# Patient Record
Sex: FEMALE | Race: BLACK OR AFRICAN AMERICAN | NOT HISPANIC OR LATINO | URBAN - METROPOLITAN AREA
[De-identification: names, ages, dates, MRNs, and addresses within clinical notes are randomized per-mention and may not be internally consistent; named-entity substitution may affect disease eponyms.]

---

## 2017-01-20 ENCOUNTER — IMPORTED ENCOUNTER (OUTPATIENT)
Dept: URBAN - METROPOLITAN AREA CLINIC 9 | Facility: CLINIC | Age: 82
End: 2017-01-20

## 2017-01-24 ENCOUNTER — IMPORTED ENCOUNTER (OUTPATIENT)
Dept: URBAN - METROPOLITAN AREA CLINIC 9 | Facility: CLINIC | Age: 82
End: 2017-01-24

## 2017-03-01 ENCOUNTER — IMPORTED ENCOUNTER (OUTPATIENT)
Dept: URBAN - METROPOLITAN AREA CLINIC 9 | Facility: CLINIC | Age: 82
End: 2017-03-01

## 2017-03-07 ENCOUNTER — IMPORTED ENCOUNTER (OUTPATIENT)
Dept: URBAN - METROPOLITAN AREA CLINIC 9 | Facility: CLINIC | Age: 82
End: 2017-03-07

## 2017-07-20 ENCOUNTER — IMPORTED ENCOUNTER (OUTPATIENT)
Dept: URBAN - METROPOLITAN AREA CLINIC 9 | Facility: CLINIC | Age: 82
End: 2017-07-20

## 2017-08-23 ENCOUNTER — IMPORTED ENCOUNTER (OUTPATIENT)
Dept: URBAN - METROPOLITAN AREA CLINIC 9 | Facility: CLINIC | Age: 82
End: 2017-08-23

## 2017-11-20 ENCOUNTER — IMPORTED ENCOUNTER (OUTPATIENT)
Dept: URBAN - METROPOLITAN AREA CLINIC 9 | Facility: CLINIC | Age: 82
End: 2017-11-20

## 2018-03-05 ENCOUNTER — IMPORTED ENCOUNTER (OUTPATIENT)
Dept: URBAN - METROPOLITAN AREA CLINIC 9 | Facility: CLINIC | Age: 83
End: 2018-03-05

## 2018-06-04 ENCOUNTER — IMPORTED ENCOUNTER (OUTPATIENT)
Dept: URBAN - METROPOLITAN AREA CLINIC 9 | Facility: CLINIC | Age: 83
End: 2018-06-04

## 2018-09-10 ENCOUNTER — IMPORTED ENCOUNTER (OUTPATIENT)
Dept: URBAN - METROPOLITAN AREA CLINIC 9 | Facility: CLINIC | Age: 83
End: 2018-09-10

## 2018-10-19 ENCOUNTER — IMPORTED ENCOUNTER (OUTPATIENT)
Dept: URBAN - METROPOLITAN AREA CLINIC 9 | Facility: CLINIC | Age: 83
End: 2018-10-19

## 2018-12-11 ENCOUNTER — IMPORTED ENCOUNTER (OUTPATIENT)
Dept: URBAN - METROPOLITAN AREA CLINIC 9 | Facility: CLINIC | Age: 83
End: 2018-12-11

## 2019-03-19 ENCOUNTER — IMPORTED ENCOUNTER (OUTPATIENT)
Dept: URBAN - METROPOLITAN AREA CLINIC 9 | Facility: CLINIC | Age: 84
End: 2019-03-19

## 2019-04-10 ENCOUNTER — IMPORTED ENCOUNTER (OUTPATIENT)
Dept: URBAN - METROPOLITAN AREA CLINIC 9 | Facility: CLINIC | Age: 84
End: 2019-04-10

## 2019-04-16 ENCOUNTER — IMPORTED ENCOUNTER (OUTPATIENT)
Dept: URBAN - METROPOLITAN AREA CLINIC 9 | Facility: CLINIC | Age: 84
End: 2019-04-16

## 2019-05-08 ENCOUNTER — IMPORTED ENCOUNTER (OUTPATIENT)
Dept: URBAN - METROPOLITAN AREA CLINIC 9 | Facility: CLINIC | Age: 84
End: 2019-05-08

## 2019-05-14 ENCOUNTER — IMPORTED ENCOUNTER (OUTPATIENT)
Dept: URBAN - METROPOLITAN AREA CLINIC 9 | Facility: CLINIC | Age: 84
End: 2019-05-14

## 2019-08-15 ENCOUNTER — IMPORTED ENCOUNTER (OUTPATIENT)
Dept: URBAN - METROPOLITAN AREA CLINIC 9 | Facility: CLINIC | Age: 84
End: 2019-08-15

## 2019-10-15 ENCOUNTER — IMPORTED ENCOUNTER (OUTPATIENT)
Dept: URBAN - METROPOLITAN AREA CLINIC 9 | Facility: CLINIC | Age: 84
End: 2019-10-15

## 2019-10-23 ENCOUNTER — IMPORTED ENCOUNTER (OUTPATIENT)
Dept: URBAN - METROPOLITAN AREA CLINIC 9 | Facility: CLINIC | Age: 84
End: 2019-10-23

## 2020-01-20 ENCOUNTER — IMPORTED ENCOUNTER (OUTPATIENT)
Dept: URBAN - METROPOLITAN AREA CLINIC 9 | Facility: CLINIC | Age: 85
End: 2020-01-20

## 2020-02-03 NOTE — PATIENT DISCUSSION
Risks, Benefits and Alternatives were discussed with patient at length for Cataract Surgery. Visual symptoms are consistent with Cataract findings on examination and current refraction no longer provides satisfactory vision. Explained advantages of Toric IOL to correct significant corneal astigmatism. LRI/AK's are also explained. Intraoperative abberometry is included to maximize lens power and position. LRI/AK's and laser vision correction are explained as alternatives or adjunct procedures. Corrective measures for astigmatism management for lens repositioning and possible addtional refractive surgery are included. Glasses or contacts will be needed for best vision at distance and near if not selected. The advantages of intraoperative abberometry were explained to the patient to improve IOL selection, particularly in patients with unusually short or long eyes, previous refractive surgery, or desiring monovision correction at the time of cataract surgery.  Patients with signs of poor dilation on exam may necessitate intraocular manipulation of pupil and can be associated with surgical complications. Patient understands and desires surgery. All questions answered. Risks, Benefits and Alternatives discussed at length for IOL placement. Doctor suggests. Patient desires. Patient will need to wear glasses for.

## 2020-02-14 NOTE — PATIENT DISCUSSION
Discussed with patient that she may notice rings  from the multi focal lens. Patient acknowledged that she understood. All questions answered re: activities allowed and surgical procedure and lens options.

## 2020-02-14 NOTE — PATIENT DISCUSSION
Risks, Benefits and Alternatives were discussed with patient at length for Cataract Surgery. Visual symptoms are consistent with Cataract findings on examination and current refraction no longer provides satisfactory vision. Explained advantages of Toric IOL to correct significant corneal astigmatism. LRI/AK's are also explained. Intraoperative abberometry is included to maximize lens power and position. LRI/AK's and laser vision correction are explained as alternatives or adjunct procedures. Corrective measures for astigmatism management for lens repositioning and possible addtional refractive surgery are included. Glasses or contacts will be needed for best vision at distance and near if not selected. The advantages of intraoperative abberometry were explained to the patient to improve IOL selection, particularly in patients with unusually short or long eyes, previous refractive surgery, or desiring monovision correction at the time of cataract surgery.  Patients with signs of poor dilation on exam may necessitate intraocular manipulation of pupil and can be associated with surgical complications. Patient understands and desires surgery. All questions answered. Risks, Benefits and Alternatives discussed at length for IOL placement. Doctor suggests MF/Toric. Patient desires MF/Toric. Patient will need to wear glasses for best corrected vision at near.

## 2020-03-03 NOTE — PATIENT DISCUSSION
Risks, Benefits and Alternatives were discussed with patient at length for Cataract Surgery. Visual symptoms are consistent with Cataract findings on examination and current refraction no longer provides satisfactory vision. Explained advantages of Toric IOL to correct significant corneal astigmatism. LRI/AK's are also explained. Intraoperative abberometry is included to maximize lens power and position. LRI/AK's and laser vision correction are explained as alternatives or adjunct procedures. Corrective measures for astigmatism management for lens repositioning and possible addtional refractive surgery are included. Glasses or contacts will be needed for best vision at distance and near if not selected. The advantages of intraoperative abberometry were explained to the patient to improve IOL selection, particularly in patients with unusually short or long eyes, previous refractive surgery, or desiring monovision correction at the time of cataract surgery.  Patients with signs of poor dilation on exam may necessitate intraocular manipulation of pupil and can be associated with surgical complications. Patient understands and desires surgery. All questions answered. Risks, Benefits and Alternatives discussed at length for IOL placement. Doctor suggests MF/Toric. Patient desiresMF/Toric. Patient will need to wear glasses for best corrected vision at near.

## 2020-05-21 ENCOUNTER — IMPORTED ENCOUNTER (OUTPATIENT)
Dept: URBAN - METROPOLITAN AREA CLINIC 9 | Facility: CLINIC | Age: 85
End: 2020-05-21

## 2020-08-24 ENCOUNTER — IMPORTED ENCOUNTER (OUTPATIENT)
Dept: URBAN - METROPOLITAN AREA CLINIC 9 | Facility: CLINIC | Age: 85
End: 2020-08-24

## 2020-11-18 ENCOUNTER — IMPORTED ENCOUNTER (OUTPATIENT)
Dept: URBAN - METROPOLITAN AREA CLINIC 9 | Facility: CLINIC | Age: 85
End: 2020-11-18

## 2020-12-23 ENCOUNTER — IMPORTED ENCOUNTER (OUTPATIENT)
Dept: URBAN - METROPOLITAN AREA CLINIC 9 | Facility: CLINIC | Age: 85
End: 2020-12-23

## 2021-03-24 ENCOUNTER — IMPORTED ENCOUNTER (OUTPATIENT)
Dept: URBAN - METROPOLITAN AREA CLINIC 9 | Facility: CLINIC | Age: 86
End: 2021-03-24

## 2021-04-01 ENCOUNTER — IMPORTED ENCOUNTER (OUTPATIENT)
Dept: URBAN - METROPOLITAN AREA CLINIC 9 | Facility: CLINIC | Age: 86
End: 2021-04-01

## 2021-04-08 ENCOUNTER — IMPORTED ENCOUNTER (OUTPATIENT)
Dept: URBAN - METROPOLITAN AREA CLINIC 9 | Facility: CLINIC | Age: 86
End: 2021-04-08

## 2021-04-19 ENCOUNTER — IMPORTED ENCOUNTER (OUTPATIENT)
Dept: URBAN - METROPOLITAN AREA CLINIC 9 | Facility: CLINIC | Age: 86
End: 2021-04-19

## 2021-07-20 ENCOUNTER — IMPORTED ENCOUNTER (OUTPATIENT)
Dept: URBAN - METROPOLITAN AREA CLINIC 9 | Facility: CLINIC | Age: 86
End: 2021-07-20

## 2021-10-17 ASSESSMENT — TONOMETRY
OD_IOP_MMHG: 18
OD_IOP_MMHG: 19
OS_IOP_MMHG: 20
OD_IOP_MMHG: 20
OD_IOP_MMHG: 18
OD_IOP_MMHG: 19
OS_IOP_MMHG: 18
OD_IOP_MMHG: 20
OD_IOP_MMHG: 16
OS_IOP_MMHG: 16
OS_IOP_MMHG: 18
OS_IOP_MMHG: 19
OS_IOP_MMHG: 18
OD_IOP_MMHG: 15
OS_IOP_MMHG: 17
OS_IOP_MMHG: 15
OS_IOP_MMHG: 14
OD_IOP_MMHG: 13
OS_IOP_MMHG: 14
OD_IOP_MMHG: 18
OD_IOP_MMHG: 19
OS_IOP_MMHG: 22
OD_IOP_MMHG: 12
OS_IOP_MMHG: 13
OD_IOP_MMHG: 15
OS_IOP_MMHG: 17
OD_IOP_MMHG: 23
OS_IOP_MMHG: 17
OS_IOP_MMHG: 18
OS_IOP_MMHG: 15
OD_IOP_MMHG: 14
OD_IOP_MMHG: 15
OD_IOP_MMHG: 20
OS_IOP_MMHG: 20
OS_IOP_MMHG: 20
OS_IOP_MMHG: 19
OD_IOP_MMHG: 13
OD_IOP_MMHG: 21
OD_IOP_MMHG: 18
OS_IOP_MMHG: 18
OS_IOP_MMHG: 15
OS_IOP_MMHG: 23
OS_IOP_MMHG: 14
OS_IOP_MMHG: 14
OS_IOP_MMHG: 17
OD_IOP_MMHG: 14
OD_IOP_MMHG: 14
OD_IOP_MMHG: 21
OD_IOP_MMHG: 18
OD_IOP_MMHG: 18
OD_IOP_MMHG: 20
OS_IOP_MMHG: 15
OS_IOP_MMHG: 20
OS_IOP_MMHG: 19
OD_IOP_MMHG: 14
OD_IOP_MMHG: 15
OS_IOP_MMHG: 17

## 2021-10-17 ASSESSMENT — VISUAL ACUITY
OD_CC: 20/30 SN
OS_CC: 20/30 SN
OD_CC: 20/20 - SN
OD_CC: 20/40 - SN
OS_SC: 20/20 -2 SN
OS_CC: 20/40 SN
OD_CC: 20/50 + SN
OS_CC: 20/30 - SN
OS_CC: 20/40 + SN
OS_CC: 20/25 -2 SN
OD_CC: 20/30 - SN
OD_CC: 20/25 -2 SN
OS_CC: 20/25 -2 SN
OS_CC: 20/30 - SN
OS_CC: 20/30 SN
OS_CC: 20/30 - SN
OS_CC: 20/30 -2 SN
OD_CC: 20/20 SN
OS_CC: 20/30 - SN
OS_CC: 20/30 -2 SN
OS_CC: 20/30 -2 SN
OS_CC: 20/30 - SN
OS_CC: 20/40 -2 SN
OS_CC: 20/25 SN
OS_CC: 20/30 -2 SN
OD_CC: 20/30 -2 SN
OD_CC: 20/40 SN
OD_CC: 20/30 + SN
OS_CC: 20/50 + SN
OD_CC: 20/30 +2 SN
OD_CC: 20/25 + SN
OD_CC: 20/30 +2 SN
OD_CC: 20/40 -2 SN
OD_CC: 20/20 SN
OD_CC: 20/30 -2 SN
OD_CC: 20/30 - SN
OD_CC: 20/30 -2 SN
OS_CC: 20/30 -2 SN
OD_SC: 20/25 SN
OD_CC: 20/30 + SN
OD_CC: 20/25 - SN
OS_CC: 20/30 -2 SN
OD_CC: 20/30 - SN
OS_CC: 20/30 -2 SN
OD_CC: 20/30 SN
OD_CC: 20/30 - SN
OS_CC: 20/30 - SN

## 2021-10-17 ASSESSMENT — PACHYMETRY
OS_CT_UM: 524.0
OD_CT_UM: 525.0

## 2021-10-17 ASSESSMENT — KERATOMETRY
OS_AXISANGLE_DEGREES: 80
OD_K1POWER_DIOPTERS: 40.25
OS_AXISANGLE2_DEGREES: 170
OD_AXISANGLE2_DEGREES: 175
OD_K2POWER_DIOPTERS: 43
OD_AXISANGLE_DEGREES: 85
OS_K1POWER_DIOPTERS: 40.75
OS_K2POWER_DIOPTERS: 43.25

## 2021-10-20 ENCOUNTER — IOP CHECK (OUTPATIENT)
Dept: URBAN - NONMETROPOLITAN AREA CLINIC 6 | Facility: CLINIC | Age: 86
End: 2021-10-20

## 2021-10-20 DIAGNOSIS — H40.1110: ICD-10-CM

## 2021-10-20 DIAGNOSIS — H40.1120: ICD-10-CM

## 2021-10-20 PROCEDURE — 92012 INTRM OPH EXAM EST PATIENT: CPT

## 2021-10-20 ASSESSMENT — VISUAL ACUITY
OS_CC: 20/30-2
OU_CC: 20/30-2
OD_CC: 20/30-2

## 2021-10-20 ASSESSMENT — TONOMETRY
OS_IOP_MMHG: 20
OD_IOP_MMHG: 24

## 2021-12-21 ENCOUNTER — FOLLOW UP (OUTPATIENT)
Dept: URBAN - NONMETROPOLITAN AREA CLINIC 6 | Facility: CLINIC | Age: 86
End: 2021-12-21

## 2021-12-21 DIAGNOSIS — H40.1121: ICD-10-CM

## 2021-12-21 DIAGNOSIS — H40.1111: ICD-10-CM

## 2021-12-21 PROCEDURE — 99213 OFFICE O/P EST LOW 20 MIN: CPT

## 2021-12-21 ASSESSMENT — TONOMETRY
OS_IOP_MMHG: 19
OD_IOP_MMHG: 21

## 2021-12-21 ASSESSMENT — VISUAL ACUITY
OS_CC: 20/30-1
OD_CC: 20/30

## 2021-12-22 ENCOUNTER — DIAGNOSTICS ONLY (OUTPATIENT)
Dept: URBAN - NONMETROPOLITAN AREA CLINIC 6 | Facility: CLINIC | Age: 86
End: 2021-12-22

## 2021-12-22 DIAGNOSIS — H40.1121: ICD-10-CM

## 2021-12-22 DIAGNOSIS — H40.1111: ICD-10-CM

## 2021-12-22 PROCEDURE — 92083 EXTENDED VISUAL FIELD XM: CPT

## 2021-12-22 ASSESSMENT — TONOMETRY
OS_IOP_MMHG: 22
OD_IOP_MMHG: 24

## 2022-03-18 NOTE — PATIENT DISCUSSION
Recommended artificial tears to use OU TID-QID and PRN for comfort. Patient is currently using GenTeal gel, advised patient that gel is not necessary at this time but is OK at bedtime if pt wishes to use.

## 2022-06-27 ENCOUNTER — ESTABLISHED PATIENT (OUTPATIENT)
Dept: URBAN - NONMETROPOLITAN AREA CLINIC 6 | Facility: CLINIC | Age: 87
End: 2022-06-27

## 2022-06-27 DIAGNOSIS — H26.491: ICD-10-CM

## 2022-06-27 DIAGNOSIS — H40.1132: ICD-10-CM

## 2022-06-27 DIAGNOSIS — E11.9: ICD-10-CM

## 2022-06-27 DIAGNOSIS — H52.223: ICD-10-CM

## 2022-06-27 PROCEDURE — 92015 DETERMINE REFRACTIVE STATE: CPT

## 2022-06-27 PROCEDURE — 92133 CPTRZD OPH DX IMG PST SGM ON: CPT

## 2022-06-27 PROCEDURE — 92014 COMPRE OPH EXAM EST PT 1/>: CPT

## 2022-06-27 ASSESSMENT — VISUAL ACUITY
OD_CC: 20/60-1
OS_SC: 20/200
OS_CC: 20/60-1
OU_CC: 20/40
OD_SC: 20/200
OU_SC: 20/100-2

## 2022-06-27 ASSESSMENT — KERATOMETRY
OS_K1POWER_DIOPTERS: 40.75
OD_K2POWER_DIOPTERS: 43.00
OS_K2POWER_DIOPTERS: 43.00
OS_AXISANGLE_DEGREES: 84
OD_AXISANGLE_DEGREES: 89
OD_K1POWER_DIOPTERS: 40.25
OS_AXISANGLE2_DEGREES: 174
OD_AXISANGLE2_DEGREES: 179

## 2022-06-27 ASSESSMENT — TONOMETRY
OD_IOP_MMHG: 20
OS_IOP_MMHG: 17

## 2022-09-29 ENCOUNTER — FOLLOW UP (OUTPATIENT)
Dept: URBAN - NONMETROPOLITAN AREA CLINIC 6 | Facility: CLINIC | Age: 87
End: 2022-09-29

## 2022-09-29 DIAGNOSIS — H40.1132: ICD-10-CM

## 2022-09-29 PROCEDURE — 92012 INTRM OPH EXAM EST PATIENT: CPT

## 2022-09-29 RX ORDER — NETARSUDIL 0.2 MG/ML: 1 SOLUTION/ DROPS OPHTHALMIC; TOPICAL EVERY EVENING

## 2022-09-29 ASSESSMENT — VISUAL ACUITY
OU_CC: 20/30-2
OS_CC: 20/40
OD_CC: 20/40-1

## 2022-09-29 ASSESSMENT — TONOMETRY
OD_IOP_MMHG: 24
OS_IOP_MMHG: 19

## 2022-09-29 ASSESSMENT — KERATOMETRY
OD_K2POWER_DIOPTERS: 43.00
OD_AXISANGLE2_DEGREES: 179
OS_K1POWER_DIOPTERS: 40.75
OS_K2POWER_DIOPTERS: 43.00
OS_AXISANGLE_DEGREES: 84
OD_K1POWER_DIOPTERS: 40.25
OS_AXISANGLE2_DEGREES: 174
OD_AXISANGLE_DEGREES: 89

## 2022-11-30 ENCOUNTER — FOLLOW UP (OUTPATIENT)
Dept: URBAN - NONMETROPOLITAN AREA CLINIC 6 | Facility: CLINIC | Age: 87
End: 2022-11-30

## 2022-11-30 DIAGNOSIS — H40.1132: ICD-10-CM

## 2022-11-30 PROCEDURE — 99213 OFFICE O/P EST LOW 20 MIN: CPT

## 2022-11-30 ASSESSMENT — TONOMETRY
OD_IOP_MMHG: 21
OS_IOP_MMHG: 18

## 2022-11-30 ASSESSMENT — VISUAL ACUITY
OD_CC: 20/50-1
OS_CC: 20/70-1
OU_CC: 20/50-1

## 2023-01-11 ENCOUNTER — DIAGNOSTICS ONLY (OUTPATIENT)
Dept: URBAN - NONMETROPOLITAN AREA CLINIC 6 | Facility: CLINIC | Age: 88
End: 2023-01-11

## 2023-01-11 DIAGNOSIS — H40.1132: ICD-10-CM

## 2023-01-11 PROCEDURE — 92083 EXTENDED VISUAL FIELD XM: CPT

## 2023-01-11 PROCEDURE — 92250 FUNDUS PHOTOGRAPHY W/I&R: CPT

## 2023-01-11 ASSESSMENT — TONOMETRY
OS_IOP_MMHG: 18
OD_IOP_MMHG: 19

## 2023-09-18 ENCOUNTER — COMPREHENSIVE EXAM (OUTPATIENT)
Dept: URBAN - NONMETROPOLITAN AREA CLINIC 6 | Facility: CLINIC | Age: 88
End: 2023-09-18

## 2023-09-18 DIAGNOSIS — H26.491: ICD-10-CM

## 2023-09-18 DIAGNOSIS — H40.1132: ICD-10-CM

## 2023-09-18 DIAGNOSIS — E11.9: ICD-10-CM

## 2023-09-18 DIAGNOSIS — H04.123: ICD-10-CM

## 2023-09-18 DIAGNOSIS — H52.223: ICD-10-CM

## 2023-09-18 PROCEDURE — 92014 COMPRE OPH EXAM EST PT 1/>: CPT

## 2023-09-18 PROCEDURE — 92133 CPTRZD OPH DX IMG PST SGM ON: CPT

## 2023-09-18 ASSESSMENT — TONOMETRY
OD_IOP_MMHG: 15
OS_IOP_MMHG: 15

## 2023-09-18 ASSESSMENT — KERATOMETRY
OD_AXISANGLE_DEGREES: 85
OS_AXISANGLE2_DEGREES: 173
OS_K2POWER_DIOPTERS: 43.25
OD_K2POWER_DIOPTERS: 43.50
OS_K1POWER_DIOPTERS: 40.25
OD_K1POWER_DIOPTERS: 39.75
OS_AXISANGLE_DEGREES: 83
OD_AXISANGLE2_DEGREES: 175

## 2023-09-18 ASSESSMENT — VISUAL ACUITY
OS_CC: 20/40
OU_CC: 20/40
OS_SC: 20/60-1
OD_CC: 20/40
OD_SC: 20/70
OU_SC: 20/50-1

## 2025-01-15 ENCOUNTER — COMPREHENSIVE EXAM (OUTPATIENT)
Age: 89
End: 2025-01-15

## 2025-01-15 DIAGNOSIS — H43.811: ICD-10-CM

## 2025-01-15 DIAGNOSIS — H26.491: ICD-10-CM

## 2025-01-15 DIAGNOSIS — H04.123: ICD-10-CM

## 2025-01-15 DIAGNOSIS — H52.223: ICD-10-CM

## 2025-01-15 DIAGNOSIS — H40.1132: ICD-10-CM

## 2025-01-15 DIAGNOSIS — E11.9: ICD-10-CM

## 2025-01-15 PROCEDURE — 92134 CPTRZ OPH DX IMG PST SGM RTA: CPT

## 2025-01-15 PROCEDURE — 92014 COMPRE OPH EXAM EST PT 1/>: CPT

## 2025-01-15 PROCEDURE — 92015 DETERMINE REFRACTIVE STATE: CPT
